# Patient Record
(demographics unavailable — no encounter records)

---

## 2025-02-27 NOTE — COUNSELING
[Fall prevention counseling provided] : Fall prevention counseling provided [AUDIT-C Screening administered and reviewed] : AUDIT-C Screening administered and reviewed [Good understanding] : Patient has a good understanding of lifestyle changes and steps needed to achieve self management goal

## 2025-03-05 NOTE — HISTORY OF PRESENT ILLNESS
[FreeTextEntry1] : LUIS [de-identified] : The patient is a 65 year old F who presents to the office for an annual wellness examination and follow up of chronic medical conditions. She reports compliance with all prescribed medical therapy and dietary restrictions.  She endorses concern with weight gain over the past year. She says she has gained about 10lbs despite being physically active.   Routine Health maintenance (as applicable) Mammogram: 4/2024 Pap Smear: UTD  DEXA (65+): 2022. Taking Vit D & calcium  Colonoscopy (45+): 2020. Repeat now due    COVID vaccine series: Flu: deferred Tdap (19-64): 2012 Shingles (50+, immunocompetent): declined  PCV (>66yo/other conditions):

## 2025-03-05 NOTE — HISTORY OF PRESENT ILLNESS
[FreeTextEntry1] : LUIS [de-identified] : The patient is a 65 year old F who presents to the office for an annual wellness examination and follow up of chronic medical conditions. She reports compliance with all prescribed medical therapy and dietary restrictions.  She endorses concern with weight gain over the past year. She says she has gained about 10lbs despite being physically active.   Routine Health maintenance (as applicable) Mammogram: 4/2024 Pap Smear: UTD  DEXA (65+): 2022. Taking Vit D & calcium  Colonoscopy (45+): 2020. Repeat now due    COVID vaccine series: Flu: deferred Tdap (19-64): 2012 Shingles (50+, immunocompetent): declined  PCV (>66yo/other conditions):

## 2025-03-05 NOTE — ASSESSMENT
[FreeTextEntry1] : The patient is a 65 year old F who presents to the office for an annual wellness examination - Fasting labs to screen for anemia, electrolyte disturbances, DM, lipid disorders, and additional metabolic disorders drawn in office - EKG reviewed - NSR - PHQ2 performed: negative - Age-appropriate Immunizations recommended  - Encouraged routine dental, vision, dermatology screenings & age-appropriate physical activity - Colonoscopy screening: now due  - Mammogram: UTD - Pap smear: UTD - DEXA: UTD    Where applicable: - Labs drawn in office. - Appropriate medication renewal(s) provided. - Provided scripts for necessary imaging and/or referrals.     Further management to be completed pending lab results and/or imaging studies. All of the patient's questions and concerns were answered in detail.

## 2025-03-05 NOTE — HEALTH RISK ASSESSMENT
[Good] : ~his/her~  mood as  good [Yes] : Yes [No] : In the past 12 months have you used drugs other than those required for medical reasons? No [One fall no injury in past year] : Patient reported one fall in the past year without injury [0] : 2) Feeling down, depressed, or hopeless: Not at all (0) [Fully functional (bathing, dressing, toileting, transferring, walking, feeding)] : Fully functional (bathing, dressing, toileting, transferring, walking, feeding) [Fully functional (using the telephone, shopping, preparing meals, housekeeping, doing laundry, using] : Fully functional and needs no help or supervision to perform IADLs (using the telephone, shopping, preparing meals, housekeeping, doing laundry, using transportation, managing medications and managing finances) [Smoke Detector] : smoke detector [Carbon Monoxide Detector] : carbon monoxide detector [Seat Belt] :  uses seat belt [Sunscreen] : uses sunscreen [Monthly or less (1 pt)] : Monthly or less (1 point) [PHQ-2 Negative - No further assessment needed] : PHQ-2 Negative - No further assessment needed [Never] : Never [FreeTextEntry1] : weight gain  [de-identified] : none [de-identified] : none [Audit-CScore] : 1 [de-identified] : strength training, yoga, walking  [de-identified] : good [de-identified] : mechanical fall (tripped) [[id number]] : 0 [de-identified] : no [Reports changes in hearing] : Reports no changes in hearing [Reports changes in vision] : Reports no changes in vision [MammogramDate] : 04/24 [PapSmearComments] : due

## 2025-03-05 NOTE — HEALTH RISK ASSESSMENT
[Good] : ~his/her~  mood as  good [Yes] : Yes [No] : In the past 12 months have you used drugs other than those required for medical reasons? No [One fall no injury in past year] : Patient reported one fall in the past year without injury [0] : 2) Feeling down, depressed, or hopeless: Not at all (0) [Fully functional (bathing, dressing, toileting, transferring, walking, feeding)] : Fully functional (bathing, dressing, toileting, transferring, walking, feeding) [Fully functional (using the telephone, shopping, preparing meals, housekeeping, doing laundry, using] : Fully functional and needs no help or supervision to perform IADLs (using the telephone, shopping, preparing meals, housekeeping, doing laundry, using transportation, managing medications and managing finances) [Smoke Detector] : smoke detector [Carbon Monoxide Detector] : carbon monoxide detector [Seat Belt] :  uses seat belt [Sunscreen] : uses sunscreen [Monthly or less (1 pt)] : Monthly or less (1 point) [PHQ-2 Negative - No further assessment needed] : PHQ-2 Negative - No further assessment needed [Never] : Never [FreeTextEntry1] : weight gain  [de-identified] : none [de-identified] : none [Audit-CScore] : 1 [de-identified] : strength training, yoga, walking  [de-identified] : good [de-identified] : mechanical fall (tripped) [[id number]] : 0 [de-identified] : no [Reports changes in hearing] : Reports no changes in hearing [Reports changes in vision] : Reports no changes in vision [MammogramDate] : 04/24 [PapSmearComments] : due